# Patient Record
Sex: FEMALE | Race: WHITE | Employment: UNEMPLOYED | URBAN - METROPOLITAN AREA
[De-identification: names, ages, dates, MRNs, and addresses within clinical notes are randomized per-mention and may not be internally consistent; named-entity substitution may affect disease eponyms.]

---

## 2017-08-21 ENCOUNTER — PATIENT OUTREACH (OUTPATIENT)
Dept: FAMILY MEDICINE CLINIC | Facility: CLINIC | Age: 47
End: 2017-08-21

## 2018-01-29 ENCOUNTER — TELEPHONE (OUTPATIENT)
Dept: FAMILY MEDICINE CLINIC | Facility: CLINIC | Age: 48
End: 2018-01-29

## 2018-01-29 RX ORDER — OSELTAMIVIR PHOSPHATE 75 MG/1
75 CAPSULE ORAL 2 TIMES DAILY
Qty: 10 CAPSULE | Refills: 0 | Status: SHIPPED | OUTPATIENT
Start: 2018-01-29 | End: 2018-02-03

## 2018-01-29 NOTE — TELEPHONE ENCOUNTER
I'll send in some tamiflu. Follow up with Dr. Kane Ken if getting worse. Fever may still last for a couple days. And flu can last 5-7 days or at times up to 10, even with the tamiflu. Hydrate, rest, OTC meds as needed.

## 2018-01-29 NOTE — TELEPHONE ENCOUNTER
Patient states she has cough, temp, sore throat, body aches, headache and eyes hurting. States symptoms started last night but have started worsening. States it really hit her about 20 minutes ago.   States  and son have had the flu in the last 1-2

## 2019-07-02 ENCOUNTER — HOSPITAL ENCOUNTER (OUTPATIENT)
Dept: GENERAL RADIOLOGY | Age: 49
Discharge: HOME | End: 2019-07-02
Attending: FAMILY MEDICINE
Payer: COMMERCIAL

## 2019-07-02 ENCOUNTER — OFFICE VISIT (OUTPATIENT)
Dept: FAMILY MEDICINE CLINIC | Facility: CLINIC | Age: 49
End: 2019-07-02

## 2019-07-02 VITALS
WEIGHT: 120 LBS | DIASTOLIC BLOOD PRESSURE: 86 MMHG | TEMPERATURE: 99 F | RESPIRATION RATE: 14 BRPM | SYSTOLIC BLOOD PRESSURE: 130 MMHG | BODY MASS INDEX: 19.99 KG/M2 | HEART RATE: 76 BPM | OXYGEN SATURATION: 98 % | HEIGHT: 65 IN

## 2019-07-02 DIAGNOSIS — M25.531 BILATERAL WRIST PAIN: ICD-10-CM

## 2019-07-02 DIAGNOSIS — M25.531 RIGHT WRIST PAIN: ICD-10-CM

## 2019-07-02 DIAGNOSIS — M79.642 BILATERAL HAND PAIN: Primary | ICD-10-CM

## 2019-07-02 DIAGNOSIS — M25.449 SWELLING OF FINGER JOINT, UNSPECIFIED LATERALITY: ICD-10-CM

## 2019-07-02 DIAGNOSIS — M25.532 BILATERAL WRIST PAIN: ICD-10-CM

## 2019-07-02 DIAGNOSIS — M79.641 RIGHT HAND PAIN: ICD-10-CM

## 2019-07-02 DIAGNOSIS — M79.641 BILATERAL HAND PAIN: Primary | ICD-10-CM

## 2019-07-02 LAB
RHEUMATOID FACT SERPL-ACNC: <10 IU/ML (ref ?–15)
SED RATE-ML: 15 MM/HR (ref 0–25)

## 2019-07-02 PROCEDURE — 73110 X-RAY EXAM OF WRIST: CPT | Performed by: FAMILY MEDICINE

## 2019-07-02 PROCEDURE — 86038 ANTINUCLEAR ANTIBODIES: CPT | Performed by: FAMILY MEDICINE

## 2019-07-02 PROCEDURE — 85652 RBC SED RATE AUTOMATED: CPT | Performed by: FAMILY MEDICINE

## 2019-07-02 PROCEDURE — 86431 RHEUMATOID FACTOR QUANT: CPT | Performed by: FAMILY MEDICINE

## 2019-07-02 PROCEDURE — 86200 CCP ANTIBODY: CPT | Performed by: FAMILY MEDICINE

## 2019-07-02 PROCEDURE — 99214 OFFICE O/P EST MOD 30 MIN: CPT | Performed by: FAMILY MEDICINE

## 2019-07-02 PROCEDURE — 73130 X-RAY EXAM OF HAND: CPT | Performed by: FAMILY MEDICINE

## 2019-07-02 RX ORDER — MELOXICAM 15 MG/1
15 TABLET ORAL DAILY
Qty: 30 TABLET | Refills: 0 | Status: SHIPPED | OUTPATIENT
Start: 2019-07-02 | End: 2019-07-15 | Stop reason: ALTCHOICE

## 2019-07-02 NOTE — PROGRESS NOTES
Jose Green is a 52year old female. CC:  Patient presents with:  Wrist Pain: per pt- in both wrists and hands      HPI:  She has had 2 weeks of B wrist and hand pain. She admits to doing a lot of work with her hands the past 4-5 weeks.  She notes s B, no accessory muscle use  GI: not examined  PSYCH: alert and oriented x 3; affect appropriate  SKIN: no rashes, no suspicious lesions on B arms and hands  BREAST: not examined/not applicable  EXTREMITIES: No clubbing, cyanosis or edema  RECTAL: not exami Standing Status: Future          Number of Occurrences: 1          Standing Expiration Date: 7/2/2020      *Venipuncture      None    Meds & Refills for this Visit:  Requested Prescriptions     Signed Prescriptions Disp Refills   • Meloxicam 15 MG O

## 2019-07-03 LAB — ANA SCREEN: NEGATIVE

## 2019-07-04 LAB — CYCLIC CITRULLINATED PEPTIDE: 3 UNITS

## 2019-07-05 ENCOUNTER — TELEPHONE (OUTPATIENT)
Dept: FAMILY MEDICINE CLINIC | Facility: CLINIC | Age: 49
End: 2019-07-05

## 2019-07-05 NOTE — TELEPHONE ENCOUNTER
Patient advised of the information per Dr. Amy Spear. Patient is not sure that she will go and see the Rheumatologist. Patient states that she has had this problem on and off since stopping the Levaquin about 3- 4 years ago.  She still gets an electric shock

## 2019-07-05 NOTE — TELEPHONE ENCOUNTER
At this point it does not seem like a typical Carpal Tunnel.  I think the next best thing to do is Consult Rheumatology     Thanks

## 2019-07-05 NOTE — TELEPHONE ENCOUNTER
Patient advised of the blood work results. She is wondering about Carpal Tunnel?  Should she see you again or see the Rheumatologist or Neurologist ?

## 2019-07-06 ENCOUNTER — PATIENT MESSAGE (OUTPATIENT)
Dept: FAMILY MEDICINE CLINIC | Facility: CLINIC | Age: 49
End: 2019-07-06

## 2019-07-06 DIAGNOSIS — M79.641 BILATERAL HAND PAIN: Primary | ICD-10-CM

## 2019-07-06 DIAGNOSIS — M79.642 BILATERAL HAND PAIN: Primary | ICD-10-CM

## 2019-07-06 NOTE — TELEPHONE ENCOUNTER
From: Della Carr  To: Clem Patricia MD  Sent: 7/6/2019 6:09 AM CDT  Subject: Referral Request    Dr. Caryl Abrams, I can't understand why you will not give me a referral to see if I have carpel tunnel.  As I mentioned to you at my appointment, I was having

## 2019-07-15 ENCOUNTER — PATIENT MESSAGE (OUTPATIENT)
Dept: FAMILY MEDICINE CLINIC | Facility: CLINIC | Age: 49
End: 2019-07-15

## 2019-07-15 ENCOUNTER — OFFICE VISIT (OUTPATIENT)
Dept: NEUROLOGY | Facility: CLINIC | Age: 49
End: 2019-07-15

## 2019-07-15 VITALS
DIASTOLIC BLOOD PRESSURE: 68 MMHG | HEART RATE: 76 BPM | RESPIRATION RATE: 16 BRPM | WEIGHT: 122 LBS | SYSTOLIC BLOOD PRESSURE: 136 MMHG | BODY MASS INDEX: 20 KG/M2

## 2019-07-15 DIAGNOSIS — M79.645 BILATERAL THUMB PAIN: Primary | ICD-10-CM

## 2019-07-15 DIAGNOSIS — M79.644 BILATERAL THUMB PAIN: Primary | ICD-10-CM

## 2019-07-15 PROCEDURE — 99243 OFF/OP CNSLTJ NEW/EST LOW 30: CPT | Performed by: OTHER

## 2019-07-15 NOTE — H&P
Neurology H&P    Prachi Hobson Patient Status:  No patient class for patient encounter    1970 MRN [id number]   Location [address] [facility], [address], [address] [facility] Attending No att. providers found   [address] Day # 0 PCP Caridad Glass MD     Sub ROS:  Gen: no unexplained weight loss  Vision: no vision changes, no new blurry or double vision  Head and Neck: no eye or ear discharge  Pulmonary: no SOB, no new cough  CV: no chest pain, no new lower extremity swelling  GI: no constipation or abdo biceps, 2+ triceps, 2+ at patella, 2+ at the ankles    GAIT: normal stance, normal toe gait and heel gait, tandem well. Negative tinels b/l      Labs:       Imaging:  XR hand 7/2/19  FINDINGS:  No acute fractures or osseous lesions are identified.   Phal

## 2019-07-15 NOTE — TELEPHONE ENCOUNTER
From: Chace Morris  To: Latha Garner MD  Sent: 7/15/2019 12:06 PM CDT  Subject: Referral Request    Thank you.

## 2019-08-01 ENCOUNTER — PATIENT MESSAGE (OUTPATIENT)
Dept: FAMILY MEDICINE CLINIC | Facility: CLINIC | Age: 49
End: 2019-08-01

## 2019-08-01 NOTE — TELEPHONE ENCOUNTER
From: Alem Astorga  To: Shaquille Calvin MD  Sent: 8/1/2019 2:13 PM CDT  Subject: Other    Thank you!!

## 2019-08-22 ENCOUNTER — TELEPHONE (OUTPATIENT)
Dept: NEUROLOGY | Facility: CLINIC | Age: 49
End: 2019-08-22

## 2019-08-29 ENCOUNTER — PROCEDURE VISIT (OUTPATIENT)
Dept: NEUROLOGY | Facility: CLINIC | Age: 49
End: 2019-08-29

## 2019-08-29 DIAGNOSIS — M79.644 BILATERAL THUMB PAIN: ICD-10-CM

## 2019-08-29 DIAGNOSIS — M79.645 BILATERAL THUMB PAIN: ICD-10-CM

## 2019-08-29 DIAGNOSIS — M79.641 PAIN IN BOTH HANDS: ICD-10-CM

## 2019-08-29 DIAGNOSIS — M79.642 PAIN IN BOTH HANDS: ICD-10-CM

## 2019-08-29 PROCEDURE — 95911 NRV CNDJ TEST 9-10 STUDIES: CPT | Performed by: OTHER

## 2019-08-29 PROCEDURE — 95886 MUSC TEST DONE W/N TEST COMP: CPT | Performed by: OTHER

## 2019-08-30 NOTE — PROCEDURES
[facility]  [address]  Phone- [phone]  Nerve Conduction & Electromyography Report            Patient: Karin Sawyer Age: 52 Years 2 Months  Patient ID: [id number] Referring M.D.: Brionna Mclean Wrist ADM 3.23 ?3.60 11.6 ?5.0 100  100 ?50.00 Wrist - ADM 7        B. Elbow ADM 5.83  12.0  103 ?115 100  B. Elbow - Wrist 15 58 ?49      A. Elbow ADM 8.59  11.1  92.3 ?70 100  A. Elbow - B. Elbow 16 58 ?49   L ULNAR - ADM      Wrist ADM 3.33 ?3.60 8.1 ? 5.0

## 2021-06-23 ENCOUNTER — PATIENT OUTREACH (OUTPATIENT)
Dept: FAMILY MEDICINE CLINIC | Facility: CLINIC | Age: 51
End: 2021-06-23

## 2021-07-24 NOTE — TELEPHONE ENCOUNTER
----- Message from Triston Glass MD sent at 7/5/2019  7:51 AM CDT -----  Please let patient know or leave message that her final test for Rheumatoid Arthritis is negative. She has had a negative rheumatologic evaluation and normal xray of the R hand.  Next yes

## 2023-01-25 ENCOUNTER — PATIENT OUTREACH (OUTPATIENT)
Dept: CASE MANAGEMENT | Age: 53
End: 2023-01-25

## 2023-01-25 NOTE — PROCEDURES
The office order for PCP request is Approved and completed on January 25, 2023.     Thanks,  [facility] [employer]

## (undated) NOTE — LETTER
Qasim Gomez [phone]  Qasim Crawford [id number] [facility] [id number]          Dear Nikole Clement,     According to our records y

## (undated) NOTE — LETTER
Dear Dr. Vaishnavi Benson,    Thank you very much for the opportunity to see your patient. Below, please find information from my consult for your patient's recent visit. I appreciate the chance to take care of your patient with you.   Please feel free to call m